# Patient Record
Sex: MALE | Race: WHITE | NOT HISPANIC OR LATINO | Employment: FULL TIME | URBAN - METROPOLITAN AREA
[De-identification: names, ages, dates, MRNs, and addresses within clinical notes are randomized per-mention and may not be internally consistent; named-entity substitution may affect disease eponyms.]

---

## 2021-06-26 ENCOUNTER — OFFICE VISIT (OUTPATIENT)
Dept: URGENT CARE | Age: 46
End: 2021-06-26
Payer: COMMERCIAL

## 2021-06-26 ENCOUNTER — APPOINTMENT (OUTPATIENT)
Dept: RADIOLOGY | Age: 46
End: 2021-06-26
Attending: NURSE PRACTITIONER
Payer: COMMERCIAL

## 2021-06-26 VITALS
BODY MASS INDEX: 29.35 KG/M2 | HEART RATE: 86 BPM | TEMPERATURE: 97.3 F | SYSTOLIC BLOOD PRESSURE: 145 MMHG | WEIGHT: 205 LBS | DIASTOLIC BLOOD PRESSURE: 86 MMHG | RESPIRATION RATE: 18 BRPM | OXYGEN SATURATION: 98 % | HEIGHT: 70 IN

## 2021-06-26 DIAGNOSIS — M25.471 ANKLE EFFUSION, RIGHT: Primary | ICD-10-CM

## 2021-06-26 DIAGNOSIS — M79.671 PAIN OF RIGHT HEEL: ICD-10-CM

## 2021-06-26 DIAGNOSIS — M77.31 HEEL SPUR, RIGHT: ICD-10-CM

## 2021-06-26 PROCEDURE — 99214 OFFICE O/P EST MOD 30 MIN: CPT | Performed by: NURSE PRACTITIONER

## 2021-06-26 PROCEDURE — 73650 X-RAY EXAM OF HEEL: CPT

## 2021-06-26 RX ORDER — NAPROXEN 500 MG/1
500 TABLET ORAL 2 TIMES DAILY WITH MEALS
Qty: 30 TABLET | Refills: 0 | Status: SHIPPED | OUTPATIENT
Start: 2021-06-26

## 2021-06-26 NOTE — PROGRESS NOTES
[id number]        NAME: Rahat Roberto is a 54 y o  male  : 1975    MRN: [id number]  DATE: 2021  TIME: 1:50 PM    Assessment and Plan   Ankle effusion, right [M25 [id number]]  1  Ankle effusion, right  naproxen (NAPROSYN) 500 mg tablet   2  Pain of right heel  XR heel / calcaneus 2+ vw right    naproxen (NAPROSYN) 500 mg tablet   3  Heel spur, right           Patient Instructions       Follow up with PCP in 3-5 days  Proceed to  ER if symptoms worsen  Your xray was preliminarily read by your provider  A radiologist will read the xray and you will be notified  You are to Rest, Ice, compression (ace wrap, splint) elevate  Take tylenol every 4-6 hours  Take the naproxen as prescribed  You are to see your PCP   Go to the ED if symptoms worsen  You are to contact podiatry on Monday AM          Chief Complaint     Chief Complaint   Patient presents with    Foot Pain     right heel pain that started thursday after having feet up on desk          History of Present Illness       This is a 54year old male who states had his feet propped up on his desk Thursday and the right foot was on the bottom  After attempting to stand up after this he had severe pain in his heel and is unable to stand, walk on the right foot  He is currently using crutches  Denies hx of fracture  Review of Systems   Review of Systems   Constitutional: Negative  HENT: Negative  Eyes: Negative  Respiratory: Negative  Cardiovascular: Negative  Gastrointestinal: Negative  Endocrine: Negative  Genitourinary: Negative  Musculoskeletal:        Right heel pain    Skin: Negative  Allergic/Immunologic: Negative  Neurological: Negative  Hematological: Negative  Psychiatric/Behavioral: Negative            Current Medications       Current Outpatient Medications:     naproxen (NAPROSYN) 500 mg tablet, Take 1 tablet (500 mg total) by mouth 2 (two) times a day with meals, Disp: 30 tablet, Rfl: 0    Current Allergies     Allergies as of 06/26/2021    (No Known Allergies)            The following portions of the patient's history were reviewed and updated as appropriate: allergies, current medications, past family history, past medical history, past social history, past surgical history and problem list      History reviewed  No pertinent past medical history  Past Surgical History:   Procedure Laterality Date    CYST REMOVAL      head       Family History   Problem Relation Age of Onset    Heart disease Mother     Hypertension Mother          Medications have been verified  Objective   /86   Pulse 86   Temp (!) 97 3 °F (36 3 °C)   Resp 18   Ht 5' 10" (1 778 m)   Wt 93 kg (205 lb)   SpO2 98%   BMI 29 41 kg/m²   No LMP for male patient  Physical Exam     Physical Exam  Vitals and nursing note reviewed  Constitutional:       General: He is not in acute distress  Appearance: Normal appearance  He is normal weight  He is not ill-appearing, toxic-appearing or diaphoretic  HENT:      Head: Normocephalic  Eyes:      Extraocular Movements: Extraocular movements intact  Cardiovascular:      Rate and Rhythm: Normal rate  Pulmonary:      Effort: Pulmonary effort is normal    Musculoskeletal:         General: Tenderness present  No swelling, deformity or signs of injury  Normal range of motion  Cervical back: Normal range of motion  Feet:       Comments: Negative thompsons test    Skin:     General: Skin is warm and dry  Capillary Refill: Capillary refill takes less than 2 seconds  Neurological:      General: No focal deficit present  Mental Status: He is alert and oriented to person, place, and time  Psychiatric:         Mood and Affect: Mood normal          Behavior: Behavior normal          Thought Content:  Thought content normal          Judgment: Judgment normal                Orthopedic injury treatment    Date/Time: 6/26/2021 1:45 PM  Performed by: EDWIN Fu  Authorized by: EDWIN Fu     Patient Location:  Clinic  Other Assisting Provider: Yes (comment) Saloni Rodrigez LPN )    Verbal consent obtained?: Yes    Risks and benefits: Risks, benefits and alternatives were discussed    Consent given by:  Patient  Patient states understanding of procedure being performed: Yes    Patient's understanding of procedure matches consent: Yes    Procedure consent matches procedure scheduled: Yes    Relevant documents present and verified: Yes    Test results available and properly labeled: Yes    Site marked: Yes    Required items: Required blood products, implants, devices and special equipment available    Patient identity confirmed:  Hospital-assigned identification number and verbally with patient  Time out: Immediately prior to the procedure a time out was called    Injury location: heel right   Neurovascular status: Neurovascularly intact    Distal perfusion: normal    Neurological function: normal    Range of motion: normal    Immobilization:  Ace wrap  Neurovascular status: Neurovascularly intact    Distal perfusion: normal    Neurological function: normal    Range of motion: normal    Patient tolerance:  Patient tolerated the procedure well with no immediate complications      Preliminary reading of xray  Heel spur   No osseous abnormality seen  ?  Effusion at heel/ankle  Waiting on rad read

## 2021-06-26 NOTE — PATIENT INSTRUCTIONS
Your xray was preliminarily read by your provider  A radiologist will read the xray and you will be notified  You are to Rest, Ice, compression (ace wrap, splint) elevate  Take tylenol every 4-6 hours  Take the naproxen as prescribed  You are to see your PCP   Go to the ED if symptoms worsen  You are to contact podiatry on Monday AM    Heel Spur   WHAT YOU NEED TO KNOW:   A heel spur develops when calcium builds up on the underside of your heel bone  Heel spurs can be caused by inflammation or strains on your foot muscles and ligaments  Heel spurs are often painless unless the tissue around your heel swells  This pain is often worse when you walk, jog, or run  DISCHARGE INSTRUCTIONS:   Contact your healthcare provider if:   · Your pain gets worse  · You have questions or concerns about your condition or care  Medicines: Your healthcare provider may  suggest any of the following:  · NSAIDs , such as ibuprofen, help decrease swelling, pain, and fever  This medicine is available with or without a doctor's order  NSAIDs can cause stomach bleeding or kidney problems in certain people  If you take blood thinner medicine, always ask your healthcare provider if NSAIDs are safe for you  Always read the medicine label and follow directions  · Acetaminophen  decreases pain and fever  It is available without a doctor's order  Ask how much to take and how often to take it  Follow directions  Acetaminophen can cause liver damage if not taken correctly  · Take your medicine as directed  Contact your healthcare provider if you think your medicine is not helping or if you have side effects  Tell him of her if you are allergic to any medicine  Keep a list of the medicines, vitamins, and herbs you take  Include the amounts, and when and why you take them  Bring the list or the pill bottles to follow-up visits  Carry your medicine list with you in case of an emergency      Self-care:   · Rest your injured foot so that it can heal   You may need to avoid putting any weight on your leg for at least 48 hours  Return to normal activities as directed  · Ice the injury for 20 minutes every 4 hours, or as directed  Use an ice pack, or put crushed ice in a plastic bag  Cover it with a towel to protect your skin  Ice helps prevent tissue damage and decreases swelling and pain  · Stretch before you get out of bed and as directed  This loosens your muscles and tendons in your legs and feet  Ask your healthcare provider which stretches you should do and how often to do them  · Your healthcare provider may give you a shoe insert  such as a pad or heel cup  He may tell you to tape your foot  Ask your healthcare provider to show you how to tape your feet properly  Decrease stress on your muscles and tendons in your feet by taping your foot or wearing the insert  · Wear properly fitting shoes  Shoes with good arch and heel support decrease the pressure on your heels  · Maintain a healthy weight  Pressure on your heels increases with increased weight  Ask your healthcare provider how much you should weigh  Ask him to help you create a weight loss program if you are overweight  Follow up with your healthcare provider as directed: You may need special inserts for your shoes  You may need more tests or treatments  Your healthcare provider may suggest physical therapy  Write down your questions so you remember to ask them during your visits  © Copyright [address] Information is for End User's use only and may not be sold, redistributed or otherwise used for commercial purposes  All illustrations and images included in CareNotes® are the copyrighted property of A D [employer] M [employer] Inc  or [facility] Roseanna Betts   The above information is an  only  It is not intended as medical advice for individual conditions or treatments   Talk to your doctor, nurse or pharmacist before following any medical regimen to see if it is safe and effective for you

## 2021-06-28 ENCOUNTER — TELEPHONE (OUTPATIENT)
Dept: FAMILY MEDICINE CLINIC | Facility: CLINIC | Age: 46
End: 2021-06-28

## 2021-06-28 NOTE — TELEPHONE ENCOUNTER
Patient has right heel pain 8/10 pain- went to urgent care and they just gave him naproxen and said it was a heel spur but he said it feels the same as when he had a gout attack a year ago and you gave him prednisone and it worked great  Is asking for prednisone again  Also asked for an appointment for today but no openings

## 2021-06-30 ENCOUNTER — OFFICE VISIT (OUTPATIENT)
Dept: FAMILY MEDICINE CLINIC | Facility: CLINIC | Age: 46
End: 2021-06-30
Payer: COMMERCIAL

## 2021-06-30 VITALS
DIASTOLIC BLOOD PRESSURE: 100 MMHG | SYSTOLIC BLOOD PRESSURE: 136 MMHG | OXYGEN SATURATION: 98 % | RESPIRATION RATE: 16 BRPM | BODY MASS INDEX: 30.06 KG/M2 | TEMPERATURE: 98.2 F | WEIGHT: 210 LBS | HEIGHT: 70 IN | HEART RATE: 82 BPM

## 2021-06-30 DIAGNOSIS — Z87.39 HISTORY OF GOUT: ICD-10-CM

## 2021-06-30 DIAGNOSIS — R03.0 ELEVATED BLOOD PRESSURE READING WITHOUT DIAGNOSIS OF HYPERTENSION: ICD-10-CM

## 2021-06-30 DIAGNOSIS — Z13.1 SCREENING FOR DIABETES MELLITUS: ICD-10-CM

## 2021-06-30 DIAGNOSIS — M79.671 PAIN OF RIGHT HEEL: Primary | ICD-10-CM

## 2021-06-30 DIAGNOSIS — M76.61 ACHILLES TENDINITIS OF RIGHT LOWER EXTREMITY: ICD-10-CM

## 2021-06-30 DIAGNOSIS — Z13.6 SCREENING FOR CARDIOVASCULAR CONDITION: ICD-10-CM

## 2021-06-30 PROCEDURE — 3725F SCREEN DEPRESSION PERFORMED: CPT | Performed by: FAMILY MEDICINE

## 2021-06-30 PROCEDURE — 99214 OFFICE O/P EST MOD 30 MIN: CPT | Performed by: FAMILY MEDICINE

## 2021-06-30 PROCEDURE — 1036F TOBACCO NON-USER: CPT | Performed by: FAMILY MEDICINE

## 2021-06-30 PROCEDURE — 3008F BODY MASS INDEX DOCD: CPT | Performed by: FAMILY MEDICINE

## 2021-06-30 RX ORDER — METHYLPREDNISOLONE 4 MG/1
TABLET ORAL
Qty: 21 EACH | Refills: 0 | Status: SHIPPED | OUTPATIENT
Start: 2021-06-30

## 2021-06-30 RX ORDER — ACETAMINOPHEN 325 MG/1
650 TABLET ORAL EVERY 6 HOURS PRN
COMMUNITY

## 2021-06-30 NOTE — PROGRESS NOTES
Assessment/Plan:    No problem-specific Assessment & Plan notes found for this encounter  Diagnoses and all orders for this visit:    Pain of right heel  -     methylPREDNISolone 4 MG tablet therapy pack; Use as directed on package  Reviewed xray report  Some relief with the naprosyn  Location of pain consistent with achilles tendinitis  Recommend stretching exercises,   Will give him rx for medrol dose pack though naprosyn is perfectly appropriate for treating the inflammation associated with this  He was advised to avoid taking the naprosyn while on the steroid pack  Ice should help as well  F/u with podiatry if no improvement  Elevated blood pressure reading without diagnosis of hypertension  Low sodium diet  Avoid alcohol  Return for bp check with PCP in next month or two  Achilles tendinitis of right lower extremity  -     methylPREDNISolone 4 MG tablet therapy pack; Use as directed on package    Screening for cardiovascular condition  -     Lipid panel; Future    Screening for diabetes mellitus  -     Comprehensive metabolic panel; Future    History of gout  Advised patient that his sx today are not suggestive of gout flare  Will however check uric acid when he goes for labs prior to upcoming physical exam   -     Uric acid; Future    Other orders  -     acetaminophen (TYLENOL) 325 mg tablet; Take 650 mg by mouth every 6 (six) hours as needed for mild pain          Subjective:      Patient ID: Nahum Escalante is a 54 y o  male with history of gout who is  here today for complaint of right heel pain  Points to achilles posteriorly as location of his pain  His pain started on 6/25/21 upon awakening  Unable to put weight on affected foot  no injury but notes that he had heel propped on desk day prior to onset of pain  Was seen in urgent care on 6/26/21 for same complaint of "heel pain" in right foot  Xray of right calcaneus showed no acute osseous abnormality   Was given rx for naprosyn to take bid  Naprosyn is helping somewhat  Now able to weight bear  Has history of gout in ankle  Wonders if this is what he has  Called [facility] pod[facility] in [place] and could not be seen until late July  HPI    The following portions of the patient's history were reviewed and updated as appropriate: He  has a past medical history of Gout  He  has a past surgical history that includes Cyst Removal   He  reports that he has never smoked  He has never used smokeless tobacco  He reports current alcohol use  He reports that he does not use drugs  Current Outpatient Medications on File Prior to Visit   Medication Sig    acetaminophen (TYLENOL) 325 mg tablet Take 650 mg by mouth every 6 (six) hours as needed for mild pain    naproxen (NAPROSYN) 500 mg tablet Take 1 tablet (500 mg total) by mouth 2 (two) times a day with meals     No current facility-administered medications on file prior to visit  He has No Known Allergies       Review of Systems      Objective:      /100 (BP Location: Left arm, Patient Position: Sitting, Cuff Size: Large)   Pulse 82   Temp 98 2 °F (36 8 °C) (Temporal)   Resp 16   Ht 5' 10" (1 778 m)   Wt 95 3 kg (210 lb)   SpO2 98%   BMI 30 13 kg/m²          Physical Exam  Vitals and nursing note reviewed  Constitutional:       General: He is not in acute distress  Appearance: Normal appearance  He is not ill-appearing, toxic-appearing or diaphoretic  HENT:      Head: Normocephalic and atraumatic  Cardiovascular:      Rate and Rhythm: Normal rate and regular rhythm  Pulmonary:      Effort: Pulmonary effort is normal       Breath sounds: Normal breath sounds  Musculoskeletal:      Right lower leg: No edema  Left lower leg: No edema  Feet:       Comments: Right ankle/foot with no deformity  There is tenderness posteriorly over achilles tendon/posterior heel   Neurological:      Mental Status: He is alert

## 2021-06-30 NOTE — PATIENT INSTRUCTIONS
Achilles Tendinitis   WHAT YOU NEED TO KNOW:   Achilles tendinitis is swelling of the tendon that connects your calf muscle to your heel bone  It may happen suddenly or become a chronic condition  Your risk for Achilles tendinitis increases as you age  DISCHARGE INSTRUCTIONS:   Contact your healthcare provider if:   · You have a fever  · Your swelling or pain gets worse  · You feel or hear a sudden pop near your ankle  · You cannot bend your ankle or put pressure on your leg  · You have questions about your condition or care  Medicines:   · NSAIDs , such as ibuprofen, help decrease swelling, pain, and fever  This medicine is available with or without a doctor's order  NSAIDs can cause stomach bleeding or kidney problems in certain people  If you take blood thinner medicine, always ask your healthcare provider if NSAIDs are safe for you  Always read the medicine label and follow directions  · Take your medicine as directed  Contact your healthcare provider if you think your medicine is not helping or if you have side effects  Tell him or her if you are allergic to any medicine  Keep a list of the medicines, vitamins, and herbs you take  Include the amounts, and when and why you take them  Bring the list or the pill bottles to follow-up visits  Carry your medicine list with you in case of an emergency  Manage your Achilles tendinitis:   · Rest  as directed  Rest decreases swelling and prevents your tendinitis from getting worse  Your healthcare provider may tell you to stop your usual training or exercise activities  Ask him when you can return to your normal activities or exercise plan  · Apply ice  on your Achilles tendon for 15 to 20 minutes every hour or as directed  Use an ice pack, or put crushed ice in a plastic bag  Cover it with a towel  Ice helps prevent tissue damage and decreases swelling and pain  · Wear a compression bandage or use tape  as directed   This will decrease swelling and pain  Ask your healthcare provider how to wrap a compression bandage or apply tape  If you use a support device ask if you should wear a compression bandage or use tape  · Elevate  your heel above the level of your heart as often as you can  This will help decrease swelling and pain  Prop your heel on pillows or blankets to keep it elevated comfortably  · Stretch  as directed when you return to your exercise program  Always warm up your muscles and stretch before you exercise  Do cool down exercises and stretches when you are finished  This will keep your muscles loose and decrease stress on your Achilles tendon  · Do bilateral heel drop exercises as directed  Bilateral heel drops strengthen your Achilles tendon  Do not do the following exercise unless your healthcare provider says it is safe:     ? Stand at the edge of a stair or raised step  Hold onto the railing for balance  ? Place the front part of your foot on the stair or step  Let the back of your foot hang off of the stair or step  ? Slowly lift your heels off the ground and then slowly lower your heels past the stair  Do not move your heels quickly  This could make your injury worse  Repeat this exercise 20 times or as directed  · Slowly increase the time and intensity when you return to your exercise program   Start with short and low intensity exercises  Ask your healthcare provider how and when to increase the time and intensity of your exercise  Wear support devices or supportive shoes as directed  Support devices may include a splint, orthotic, or brace  These devices will decrease pressure on your Achilles tendon and help relieve pain  Supportive shoes will cushion your heel and protect your Achilles tendon  Replace shoes or sneakers that are worn out     Go to physical therapy and practice exercises as directed:  A physical therapist teaches you exercises to help improve movement and strength, and decrease pain  Practice these exercises at home as directed  Follow up with your healthcare provider as directed:  Write down your questions so you remember to ask them during your visits  © Copyright [address] Information is for End User's use only and may not be sold, redistributed or otherwise used for commercial purposes  All illustrations and images included in CareNotes® are the copyrighted property of [employer] M [employer] Inc  or [address] [place] [address]  The above information is an  only  It is not intended as medical advice for individual conditions or treatments  Talk to your doctor, nurse or pharmacist before following any medical regimen to see if it is safe and effective for you  Get labs done as ordered and schedule appt for physical and recheck of your blood pressure with Dr Zhang Schwartz